# Patient Record
Sex: MALE | Race: WHITE | URBAN - NONMETROPOLITAN AREA
[De-identification: names, ages, dates, MRNs, and addresses within clinical notes are randomized per-mention and may not be internally consistent; named-entity substitution may affect disease eponyms.]

---

## 2023-04-28 ENCOUNTER — LAB OUTSIDE AN ENCOUNTER (OUTPATIENT)
Dept: URBAN - NONMETROPOLITAN AREA CLINIC 13 | Facility: CLINIC | Age: 23
End: 2023-04-28

## 2023-04-28 ENCOUNTER — CLAIMS CREATED FROM THE CLAIM WINDOW (OUTPATIENT)
Dept: URBAN - NONMETROPOLITAN AREA CLINIC 13 | Facility: CLINIC | Age: 23
End: 2023-04-28
Payer: OTHER GOVERNMENT

## 2023-04-28 ENCOUNTER — DASHBOARD ENCOUNTERS (OUTPATIENT)
Age: 23
End: 2023-04-28

## 2023-04-28 ENCOUNTER — WEB ENCOUNTER (OUTPATIENT)
Dept: URBAN - NONMETROPOLITAN AREA CLINIC 13 | Facility: CLINIC | Age: 23
End: 2023-04-28

## 2023-04-28 VITALS
WEIGHT: 170 LBS | BODY MASS INDEX: 24.34 KG/M2 | SYSTOLIC BLOOD PRESSURE: 120 MMHG | HEART RATE: 57 BPM | DIASTOLIC BLOOD PRESSURE: 75 MMHG | HEIGHT: 70 IN

## 2023-04-28 DIAGNOSIS — R19.7 DIARRHEA, UNSPECIFIED TYPE: ICD-10-CM

## 2023-04-28 DIAGNOSIS — R19.8 POSITIVE MURPHY'S SIGN: ICD-10-CM

## 2023-04-28 PROBLEM — 62315008: Status: ACTIVE | Noted: 2023-04-28

## 2023-04-28 PROBLEM — 300352008: Status: ACTIVE | Noted: 2023-04-28

## 2023-04-28 PROCEDURE — 99204 OFFICE O/P NEW MOD 45 MIN: CPT | Performed by: INTERNAL MEDICINE

## 2023-04-28 PROCEDURE — 99244 OFF/OP CNSLTJ NEW/EST MOD 40: CPT | Performed by: INTERNAL MEDICINE

## 2023-04-28 PROCEDURE — 99244 OFF/OP CNSLTJ NEW/EST MOD 40: CPT

## 2023-04-28 RX ORDER — COLESTIPOL HYDROCHLORIDE 1 G/1
2 TABLETS TABLET ORAL ONCE A DAY
Qty: 60 | OUTPATIENT
Start: 2023-04-28

## 2023-04-28 NOTE — HPI-TODAY'S VISIT:
4/28/23 Diogenes presents for evaluation of frequent stools after eating referred by his primary provider Dr. Héctor Rockwell. A copy of this note and recommendation will be forwarded to his office.  Patient reports onset of having bowel movement soon after meals for approximately two years. He felt this may self resolve and tried avoiding certain meats in diet , but now he is pursuing further workup. He reprots 2-3 BM per day, sometimes worse with salads. He notes no alleviating factors. He denies watery output or frequent stool if he has not eaten. Denies blood per rectum or any upper GI symptoms including heartburn, nausea or reflux. He does not awaken at night for a BM. He has no known family medical history of Celiac or any bowel diseases. He has gained weight recently without trying SP

## 2023-05-05 ENCOUNTER — OFFICE VISIT (OUTPATIENT)
Dept: URBAN - NONMETROPOLITAN AREA CLINIC 13 | Facility: CLINIC | Age: 23
End: 2023-05-05

## 2023-05-15 ENCOUNTER — TELEPHONE ENCOUNTER (OUTPATIENT)
Dept: URBAN - NONMETROPOLITAN AREA CLINIC 2 | Facility: CLINIC | Age: 23
End: 2023-05-15

## 2023-05-24 ENCOUNTER — ERX REFILL RESPONSE (OUTPATIENT)
Dept: URBAN - NONMETROPOLITAN AREA CLINIC 2 | Facility: CLINIC | Age: 23
End: 2023-05-24

## 2023-05-24 RX ORDER — COLESTIPOL HYDROCHLORIDE 1 G/1
TAKE 2 TABLETS BY MOUTH EVERY DAY FOR 30 DAYS TABLET, FILM COATED ORAL
Qty: 60 TABLET | Refills: 0 | OUTPATIENT

## 2023-05-24 RX ORDER — COLESTIPOL HYDROCHLORIDE 1 G/1
2 TABLETS TABLET ORAL ONCE A DAY
Qty: 60 | OUTPATIENT

## 2023-07-05 ENCOUNTER — ERX REFILL RESPONSE (OUTPATIENT)
Dept: URBAN - NONMETROPOLITAN AREA CLINIC 2 | Facility: CLINIC | Age: 23
End: 2023-07-05

## 2023-07-05 RX ORDER — COLESTIPOL HYDROCHLORIDE 1 G/1
TAKE 2 TABLETS BY MOUTH EVERY DAY TABLET, FILM COATED ORAL
Qty: 60 TABLET | Refills: 4 | OUTPATIENT

## 2023-07-05 RX ORDER — COLESTIPOL HYDROCHLORIDE 1 G/1
TAKE 2 TABLETS BY MOUTH EVERY DAY FOR 30 DAYS TABLET, FILM COATED ORAL
Qty: 60 TABLET | Refills: 0 | OUTPATIENT

## 2023-10-17 ENCOUNTER — OFFICE VISIT (OUTPATIENT)
Dept: URBAN - NONMETROPOLITAN AREA CLINIC 2 | Facility: CLINIC | Age: 23
End: 2023-10-17

## 2023-11-29 ENCOUNTER — OFFICE VISIT (OUTPATIENT)
Dept: URBAN - NONMETROPOLITAN AREA CLINIC 2 | Facility: CLINIC | Age: 23
End: 2023-11-29

## 2023-11-29 RX ORDER — COLESTIPOL HYDROCHLORIDE 1 G/1
TAKE 2 TABLETS BY MOUTH EVERY DAY TABLET, FILM COATED ORAL
Qty: 60 TABLET | Refills: 4 | Status: ACTIVE | COMMUNITY